# Patient Record
Sex: FEMALE | Race: WHITE | NOT HISPANIC OR LATINO
[De-identification: names, ages, dates, MRNs, and addresses within clinical notes are randomized per-mention and may not be internally consistent; named-entity substitution may affect disease eponyms.]

---

## 2017-07-17 ENCOUNTER — RESULT REVIEW (OUTPATIENT)
Age: 32
End: 2017-07-17

## 2018-01-28 ENCOUNTER — EMERGENCY (EMERGENCY)
Facility: HOSPITAL | Age: 33
LOS: 0 days | Discharge: HOME | End: 2018-01-29
Attending: EMERGENCY MEDICINE | Admitting: EMERGENCY MEDICINE
Payer: COMMERCIAL

## 2018-01-28 VITALS — HEIGHT: 67 IN | WEIGHT: 139.99 LBS

## 2018-01-28 DIAGNOSIS — R11.10 VOMITING, UNSPECIFIED: ICD-10-CM

## 2018-01-28 DIAGNOSIS — R11.0 NAUSEA: ICD-10-CM

## 2018-01-28 PROCEDURE — 99284 EMERGENCY DEPT VISIT MOD MDM: CPT | Mod: 25

## 2018-01-28 PROCEDURE — 99053 MED SERV 10PM-8AM 24 HR FAC: CPT

## 2018-01-29 VITALS
SYSTOLIC BLOOD PRESSURE: 110 MMHG | DIASTOLIC BLOOD PRESSURE: 73 MMHG | OXYGEN SATURATION: 100 % | RESPIRATION RATE: 16 BRPM | HEART RATE: 119 BPM | TEMPERATURE: 98 F

## 2018-01-29 LAB
ALBUMIN SERPL ELPH-MCNC: 3.8 G/DL — SIGNIFICANT CHANGE UP (ref 3.3–5)
ALP SERPL-CCNC: 52 U/L — SIGNIFICANT CHANGE UP (ref 40–120)
ALT FLD-CCNC: 27 U/L — SIGNIFICANT CHANGE UP (ref 12–78)
ANION GAP SERPL CALC-SCNC: 6 MMOL/L — SIGNIFICANT CHANGE UP (ref 5–17)
AST SERPL-CCNC: 16 U/L — SIGNIFICANT CHANGE UP (ref 15–37)
BASOPHILS # BLD AUTO: 0 K/UL — SIGNIFICANT CHANGE UP (ref 0–0.2)
BASOPHILS NFR BLD AUTO: 0.3 % — SIGNIFICANT CHANGE UP (ref 0–2)
BILIRUB SERPL-MCNC: 0.8 MG/DL — SIGNIFICANT CHANGE UP (ref 0.2–1.2)
BUN SERPL-MCNC: 21 MG/DL — SIGNIFICANT CHANGE UP (ref 7–23)
CALCIUM SERPL-MCNC: 8.3 MG/DL — LOW (ref 8.5–10.1)
CHLORIDE SERPL-SCNC: 105 MMOL/L — SIGNIFICANT CHANGE UP (ref 96–108)
CO2 SERPL-SCNC: 29 MMOL/L — SIGNIFICANT CHANGE UP (ref 22–31)
CREAT SERPL-MCNC: 0.6 MG/DL — SIGNIFICANT CHANGE UP (ref 0.5–1.3)
EOSINOPHIL # BLD AUTO: 0 K/UL — SIGNIFICANT CHANGE UP (ref 0–0.5)
EOSINOPHIL NFR BLD AUTO: 0.1 % — SIGNIFICANT CHANGE UP (ref 0–6)
GLUCOSE SERPL-MCNC: 139 MG/DL — HIGH (ref 70–99)
HCT VFR BLD CALC: 43.2 % — SIGNIFICANT CHANGE UP (ref 34.5–45)
HGB BLD-MCNC: 15.1 G/DL — SIGNIFICANT CHANGE UP (ref 11.5–15.5)
LYMPHOCYTES # BLD AUTO: 0.3 K/UL — LOW (ref 1–3.3)
LYMPHOCYTES # BLD AUTO: 2.3 % — LOW (ref 13–44)
MANUAL DIF COMMENT BLD-IMP: SIGNIFICANT CHANGE UP
MCHC RBC-ENTMCNC: 30.1 PG — SIGNIFICANT CHANGE UP (ref 27–34)
MCHC RBC-ENTMCNC: 35 GM/DL — SIGNIFICANT CHANGE UP (ref 32–36)
MCV RBC AUTO: 86 FL — SIGNIFICANT CHANGE UP (ref 80–100)
MONOCYTES # BLD AUTO: 0.4 K/UL — SIGNIFICANT CHANGE UP (ref 0–0.9)
MONOCYTES NFR BLD AUTO: 3.1 % — SIGNIFICANT CHANGE UP (ref 2–14)
NEUTROPHILS # BLD AUTO: 13 K/UL — HIGH (ref 1.8–7.4)
NEUTROPHILS NFR BLD AUTO: 94.2 % — HIGH (ref 43–77)
PLAT MORPH BLD: NORMAL — SIGNIFICANT CHANGE UP
PLATELET # BLD AUTO: 302 K/UL — SIGNIFICANT CHANGE UP (ref 150–400)
POTASSIUM SERPL-MCNC: 3.7 MMOL/L — SIGNIFICANT CHANGE UP (ref 3.5–5.3)
POTASSIUM SERPL-SCNC: 3.7 MMOL/L — SIGNIFICANT CHANGE UP (ref 3.5–5.3)
PROT SERPL-MCNC: 6.9 GM/DL — SIGNIFICANT CHANGE UP (ref 6–8.3)
RBC # BLD: 5.02 M/UL — SIGNIFICANT CHANGE UP (ref 3.8–5.2)
RBC # FLD: 10.7 % — SIGNIFICANT CHANGE UP (ref 10.3–14.5)
RBC BLD AUTO: NORMAL — SIGNIFICANT CHANGE UP
SODIUM SERPL-SCNC: 140 MMOL/L — SIGNIFICANT CHANGE UP (ref 135–145)
WBC # BLD: 13.8 K/UL — HIGH (ref 3.8–10.5)
WBC # FLD AUTO: 13.8 K/UL — HIGH (ref 3.8–10.5)

## 2018-01-29 RX ORDER — ONDANSETRON 8 MG/1
8 TABLET, FILM COATED ORAL ONCE
Qty: 0 | Refills: 0 | Status: COMPLETED | OUTPATIENT
Start: 2018-01-29 | End: 2018-01-29

## 2018-01-29 RX ORDER — SODIUM CHLORIDE 9 MG/ML
1000 INJECTION INTRAMUSCULAR; INTRAVENOUS; SUBCUTANEOUS ONCE
Qty: 0 | Refills: 0 | Status: COMPLETED | OUTPATIENT
Start: 2018-01-29 | End: 2018-01-29

## 2018-01-29 RX ADMIN — SODIUM CHLORIDE 500 MILLILITER(S): 9 INJECTION INTRAMUSCULAR; INTRAVENOUS; SUBCUTANEOUS at 00:32

## 2018-01-29 RX ADMIN — ONDANSETRON 8 MILLIGRAM(S): 8 TABLET, FILM COATED ORAL at 00:32

## 2018-01-29 NOTE — ED PROVIDER NOTE - OBJECTIVE STATEMENT
32 year old female with no significant PMH presents with cc of nausea, vomiting, cough, congestion for the last few days. No blood in the vomit. No fever or chills. 32 year old female with no significant PMH presents with cc of nausea, vomiting, cough, coryza, and congestion for the last few days. No blood in the vomit. No fever or chills. No melena or hematochezia. No visual or neurological complaints. No vaginal bleeding or discharge.

## 2018-01-29 NOTE — ED ADULT NURSE NOTE - NS ED NURSE DC INFO COMPLEXITY
Verbalized Understanding/Returned Demonstration/Moderate: Comprehensive teaching/Patient asked questions

## 2018-01-29 NOTE — ED PROVIDER NOTE - MEDICAL DECISION MAKING DETAILS
Meghan HILL: Tolerating PO prior to dc. Nontoxic appearing. Likely viral etiology. Outpatient follow up and instructions to return if any worsening of symptoms provided.

## 2018-01-29 NOTE — ED ADULT NURSE NOTE - ED STAT RN HANDOFF DETAILS
Received report from RN Yessi and Rebeca and reassessed patient. Agree with the previous RN assessment. Patient has an IVF bolus running, reports that she has significant improvement post medication administration. Will continue to monitor/reassess.

## 2018-01-29 NOTE — ED ADULT NURSE NOTE - OBJECTIVE STATEMENT
pt presents to ED for eval of abdominal pain, nausea, vomiting, and diarrhea for 2 weeks. per pt, her  and son have been exhibiting the same symptoms. pt states she has been unable to tolerate PO today. pt denies chest pain and dizziness. no SOB or distress noted. 18 g placed to left AC. labs sent. meds as documented.

## 2024-12-19 ENCOUNTER — APPOINTMENT (OUTPATIENT)
Dept: MRI IMAGING | Facility: CLINIC | Age: 39
End: 2024-12-19
Payer: SELF-PAY

## 2024-12-19 ENCOUNTER — TRANSCRIPTION ENCOUNTER (OUTPATIENT)
Age: 39
End: 2024-12-19

## 2024-12-19 ENCOUNTER — OUTPATIENT (OUTPATIENT)
Dept: OUTPATIENT SERVICES | Facility: HOSPITAL | Age: 39
LOS: 1 days | End: 2024-12-19
Payer: SELF-PAY

## 2024-12-19 DIAGNOSIS — Z00.8 ENCOUNTER FOR OTHER GENERAL EXAMINATION: ICD-10-CM

## 2024-12-19 PROCEDURE — 73721 MRI JNT OF LWR EXTRE W/O DYE: CPT | Mod: 26,RT

## 2024-12-19 PROCEDURE — 73721 MRI JNT OF LWR EXTRE W/O DYE: CPT

## 2025-09-01 ENCOUNTER — NON-APPOINTMENT (OUTPATIENT)
Age: 40
End: 2025-09-01

## 2025-09-16 ENCOUNTER — NON-APPOINTMENT (OUTPATIENT)
Age: 40
End: 2025-09-16

## 2025-09-16 ENCOUNTER — APPOINTMENT (OUTPATIENT)
Dept: FAMILY MEDICINE | Facility: CLINIC | Age: 40
End: 2025-09-16
Payer: COMMERCIAL

## 2025-09-16 VITALS
BODY MASS INDEX: 22.76 KG/M2 | OXYGEN SATURATION: 99 % | DIASTOLIC BLOOD PRESSURE: 74 MMHG | WEIGHT: 145 LBS | HEIGHT: 67 IN | SYSTOLIC BLOOD PRESSURE: 110 MMHG | HEART RATE: 84 BPM

## 2025-09-16 DIAGNOSIS — Z84.89 FAMILY HISTORY OF OTHER SPECIFIED CONDITIONS: ICD-10-CM

## 2025-09-16 DIAGNOSIS — Z80.3 FAMILY HISTORY OF MALIGNANT NEOPLASM OF BREAST: ICD-10-CM

## 2025-09-16 DIAGNOSIS — L74.510 PRIMARY FOCAL HYPERHIDROSIS, AXILLA: ICD-10-CM

## 2025-09-16 DIAGNOSIS — F41.9 ANXIETY DISORDER, UNSPECIFIED: ICD-10-CM

## 2025-09-16 DIAGNOSIS — R23.3 SPONTANEOUS ECCHYMOSES: ICD-10-CM

## 2025-09-16 PROCEDURE — 99204 OFFICE O/P NEW MOD 45 MIN: CPT | Mod: 25

## 2025-09-16 PROCEDURE — 99385 PREV VISIT NEW AGE 18-39: CPT

## 2025-09-16 RX ORDER — HYDROXYZINE HYDROCHLORIDE 25 MG/1
25 TABLET ORAL
Qty: 30 | Refills: 3 | Status: ACTIVE | COMMUNITY
Start: 2025-09-16 | End: 1900-01-01

## 2025-09-16 RX ORDER — GLYCOPYRRONIUM 2.4 G/100G
2.4 CLOTH TOPICAL
Qty: 1 | Refills: 0 | Status: ACTIVE | COMMUNITY
Start: 2025-09-16 | End: 1900-01-01

## 2025-09-16 RX ORDER — NORETHINDRONE ACETATE AND ETHINYL ESTRADIOL AND FERROUS FUMARATE 1MG-20(21)
KIT ORAL
Refills: 0 | Status: ACTIVE | COMMUNITY

## 2025-09-18 ENCOUNTER — APPOINTMENT (OUTPATIENT)
Dept: FAMILY MEDICINE | Facility: CLINIC | Age: 40
End: 2025-09-18
Payer: COMMERCIAL

## 2025-09-18 DIAGNOSIS — Z00.00 ENCOUNTER FOR GENERAL ADULT MEDICAL EXAMINATION W/OUT ABNORMAL FINDINGS: ICD-10-CM

## 2025-09-18 PROCEDURE — 36415 COLL VENOUS BLD VENIPUNCTURE: CPT

## 2025-09-19 LAB
ALBUMIN SERPL ELPH-MCNC: 3.9 G/DL
ALP BLD-CCNC: 49 U/L
ALT SERPL-CCNC: 11 U/L
ANION GAP SERPL CALC-SCNC: 12 MMOL/L
APTT BLD: 31.2 SEC
AST SERPL-CCNC: 17 U/L
BASOPHILS # BLD AUTO: 0.07 K/UL
BASOPHILS NFR BLD AUTO: 0.7 %
BILIRUB SERPL-MCNC: 0.4 MG/DL
BUN SERPL-MCNC: 12 MG/DL
CALCIUM SERPL-MCNC: 8.8 MG/DL
CHLORIDE SERPL-SCNC: 103 MMOL/L
CHOLEST SERPL-MCNC: 185 MG/DL
CO2 SERPL-SCNC: 22 MMOL/L
CREAT SERPL-MCNC: 0.64 MG/DL
EGFRCR SERPLBLD CKD-EPI 2021: 116 ML/MIN/1.73M2
EOSINOPHIL # BLD AUTO: 0.54 K/UL
EOSINOPHIL NFR BLD AUTO: 5.6 %
ESTIMATED AVERAGE GLUCOSE: 88 MG/DL
FERRITIN SERPL-MCNC: 82 NG/ML
GLUCOSE SERPL-MCNC: 86 MG/DL
HBA1C MFR BLD HPLC: 4.7 %
HCT VFR BLD CALC: 44.5 %
HDLC SERPL-MCNC: 100 MG/DL
HGB BLD-MCNC: 14.8 G/DL
IMM GRANULOCYTES NFR BLD AUTO: 0.4 %
INR PPP: 0.9 RATIO
IRON SATN MFR SERPL: 26 %
IRON SERPL-MCNC: 102 UG/DL
LDLC SERPL-MCNC: 67 MG/DL
LYMPHOCYTES # BLD AUTO: 2.72 K/UL
LYMPHOCYTES NFR BLD AUTO: 28 %
MAN DIFF?: NORMAL
MCHC RBC-ENTMCNC: 31 PG
MCHC RBC-ENTMCNC: 33.3 G/DL
MCV RBC AUTO: 93.3 FL
MONOCYTES # BLD AUTO: 0.56 K/UL
MONOCYTES NFR BLD AUTO: 5.8 %
NEUTROPHILS # BLD AUTO: 5.79 K/UL
NEUTROPHILS NFR BLD AUTO: 59.5 %
NONHDLC SERPL-MCNC: 85 MG/DL
PLATELET # BLD AUTO: 362 K/UL
POTASSIUM SERPL-SCNC: 4.3 MMOL/L
PROT SERPL-MCNC: 6.5 G/DL
PT BLD: 10.5 SEC
RBC # BLD: 4.77 M/UL
RBC # FLD: 13.1 %
SODIUM SERPL-SCNC: 136 MMOL/L
TIBC SERPL-MCNC: 387 UG/DL
TRIGL SERPL-MCNC: 105 MG/DL
TSH SERPL-ACNC: 2.15 UIU/ML
UIBC SERPL-MCNC: 285 UG/DL
WBC # FLD AUTO: 9.72 K/UL